# Patient Record
Sex: MALE
[De-identification: names, ages, dates, MRNs, and addresses within clinical notes are randomized per-mention and may not be internally consistent; named-entity substitution may affect disease eponyms.]

---

## 2018-08-15 ENCOUNTER — HOSPITAL ENCOUNTER (EMERGENCY)
Dept: HOSPITAL 14 - H.ER | Age: 43
Discharge: HOME | End: 2018-08-15
Payer: MEDICAID

## 2018-08-15 VITALS
SYSTOLIC BLOOD PRESSURE: 168 MMHG | TEMPERATURE: 97.9 F | RESPIRATION RATE: 20 BRPM | DIASTOLIC BLOOD PRESSURE: 103 MMHG | OXYGEN SATURATION: 98 % | HEART RATE: 62 BPM

## 2018-08-15 DIAGNOSIS — R42: Primary | ICD-10-CM

## 2018-08-15 DIAGNOSIS — E11.9: ICD-10-CM

## 2018-08-15 DIAGNOSIS — I10: ICD-10-CM

## 2018-08-15 DIAGNOSIS — R20.2: ICD-10-CM

## 2018-08-15 DIAGNOSIS — F41.9: ICD-10-CM

## 2018-08-15 LAB
ALBUMIN SERPL-MCNC: 4.1 G/DL (ref 3.5–5)
ALBUMIN/GLOB SERPL: 1 {RATIO} (ref 1–2.1)
ALT SERPL-CCNC: 28 U/L (ref 21–72)
AST SERPL-CCNC: 37 U/L (ref 17–59)
BASOPHILS # BLD AUTO: 0.1 K/UL (ref 0–0.2)
BASOPHILS NFR BLD: 1.4 % (ref 0–2)
BUN SERPL-MCNC: 11 MG/DL (ref 9–20)
CALCIUM SERPL-MCNC: 8.6 MG/DL (ref 8.4–10.2)
EOSINOPHIL # BLD AUTO: 0 K/UL (ref 0–0.7)
EOSINOPHIL NFR BLD: 0.5 % (ref 0–4)
ERYTHROCYTE [DISTWIDTH] IN BLOOD BY AUTOMATED COUNT: 14.6 % (ref 11.5–14.5)
GFR NON-AFRICAN AMERICAN: > 60
HGB BLD-MCNC: 13.9 G/DL (ref 12–18)
LYMPHOCYTES # BLD AUTO: 2.5 K/UL (ref 1–4.3)
LYMPHOCYTES NFR BLD AUTO: 24.7 % (ref 20–40)
MCH RBC QN AUTO: 29.2 PG (ref 27–31)
MCHC RBC AUTO-ENTMCNC: 33.9 G/DL (ref 33–37)
MCV RBC AUTO: 86.1 FL (ref 80–94)
MONOCYTES # BLD: 0.6 K/UL (ref 0–0.8)
MONOCYTES NFR BLD: 6.1 % (ref 0–10)
NEUTROPHILS # BLD: 6.9 K/UL (ref 1.8–7)
NEUTROPHILS NFR BLD AUTO: 67.3 % (ref 50–75)
NRBC BLD AUTO-RTO: 0.1 % (ref 0–0)
PLATELET # BLD: 238 K/UL (ref 130–400)
PMV BLD AUTO: 10.3 FL (ref 7.2–11.7)
RBC # BLD AUTO: 4.77 MIL/UL (ref 4.4–5.9)
WBC # BLD AUTO: 10.3 K/UL (ref 4.8–10.8)

## 2018-08-15 NOTE — ED PDOC
HPI: General Adult


Time Seen by Provider: 08/15/18 12:26


Chief Complaint (Nursing): Dizziness/Lightheaded


History Per: Patient


History/Exam Limitations: no limitations


Onset/Duration Of Symptoms: Days


Current Symptoms Are (Timing): Better


Additional Complaint(s): 


43 year old male with a past medical history of high blood pressure and 

diabetes presents to the ED for an evaluation of dizziness and weakness. 

Patient states the dizziness started yesterday around 3-4pm with associated 

symptoms of double vision and numbness in his arms so he went home. At home, he 

took a shower and went to sleep at 9:30pm. He woke up and went out to eat tacos 

and then went to bed at 12am. When patient woke up at 10:30am today, he started 

to hear voices that were bothering him. Also reports of light headedness and 

states he has no strength in his arms and legs. Patient states he feels much 

better today. Denies abdominal pain, chest pain, fever or cough. 


PMD: Jamal Cortes








Past Medical History


Reviewed: Historical Data, Nursing Documentation, Vital Signs


Vital Signs: 


 Last Vital Signs











Temp  97.9 F   08/15/18 12:18


 


Pulse  62   08/15/18 12:18


 


Resp  20   08/15/18 12:18


 


BP  168/103 H  08/15/18 12:18


 


Pulse Ox  98   08/15/18 13:27














- Medical History


PMH: Anxiety, Diabetes, HTN





- Surgical History


Surgical History: No Surg Hx





- Family History


Family History: States: Unknown Family Hx





- Social History


Current smoker - smoking cessation education provided: No


Alcohol: None


Drugs: Denies





- Allergies


Allergies/Adverse Reactions: 


 Allergies











Allergy/AdvReac Type Severity Reaction Status Date / Time


 


No Known Allergies Allergy   Verified 08/15/18 12:19














Review of Systems


ROS Statement: Except As Marked, All Systems Reviewed And Found Negative


Constitutional: Positive for: Weakness.  Negative for: Fever


Eyes: Positive for: Vision Change


Cardiovascular: Negative for: Chest Pain


Respiratory: Negative for: Cough


Gastrointestinal: Negative for: Abdominal Pain


Neurological: Positive for: Dizziness


Psych: Negative for: Suicidal ideation (homicidal ideation)





Physical Exam





- Reviewed


Nursing Documentation Reviewed: Yes


Vital Signs Reviewed: Yes





- Physical Exam


Appears: Positive for: Well, Non-toxic, No Acute Distress


Head Exam: Positive for: ATRAUMATIC, NORMAL INSPECTION, NORMOCEPHALIC


Skin: Positive for: Normal Color, Warm, Dry


Eye Exam: Positive for: EOMI, Normal appearance, PERRL


ENT: Positive for: Normal ENT Inspection


Neck: Positive for: Normal, Painless ROM, Supple.  Negative for: Decreased ROM


Cardiovascular/Chest: Positive for: Regular Rate, Rhythm.  Negative for: Murmur


Respiratory: Positive for: Normal Breath Sounds.  Negative for: Decreased 

Breath Sounds, Wheezing, Respiratory Distress


Gastrointestinal/Abdominal: Positive for: Normal Exam, Bowel Sounds, Soft.  

Negative for: Tenderness, Guarding, Rebound


Back: Positive for: Normal Inspection


Extremity: Positive for: Normal ROM.  Negative for: Tenderness, Pedal Edema, 

Deformity


Neurologic/Psych: Positive for: Alert, Oriented (x3).  Negative for: Motor/

Sensory Deficits





- Laboratory Results


Result Diagrams: 


 08/15/18 13:24





 08/15/18 13:24





- ECG


O2 Sat by Pulse Oximetry: 98 (RA)


Pulse Ox Interpretation: Normal





- Progress


Re-evaluation Time: 14:00


Condition: Improved





Medical Decision Making


Medical Decision Making: 


Time: 1301


Initial Plan:


--EKG


--CMP


--ED Urine Dipstick 


--CBC w/ Differential 


--Heplock Insertion  


--Reevaluation 





--------------------------------------------------------------------------------

-----------------


Scribe Attestation:   


Documented by Meng Baron, acting as a scribe for Grace Amin MD





Provider Scribe Attestation:


All medical record entries made by the Scribe were at my direction and 

personally dictated by me. I have reviewed the chart and agree that the record 

accurately reflects my personal performance of the history, physical exam, 

medical decision making, and the department course for this patient. I have 

also personally directed, reviewed, and agree with the discharge instructions 

and disposition.





 








Disposition





- Clinical Impression


Clinical Impression: 


 Dizziness, Hypertension








- Patient ED Disposition


Is Patient to be Admitted: No


Doctor Will See Patient In The: Office


Counseled Patient/Family Regarding: Diagnosis, Need For Followup, Rx Given





- Disposition


Referrals: 


[facility] [place] [Outside]


Sonal De La Cruz MD [Medical Doctor] - 


Disposition: Routine/Home


Disposition Time: 14:00


Condition: IMPROVED


Instructions:  Dizziness, Nonvertigo, (DC), High Blood Pressure in Adults


Forms:  [facility] (English)


Print Language: Lithuanian





- POA


Present On Arrival: None

## 2018-08-16 NOTE — CARD
--------------- APPROVED REPORT --------------





Date of service: 08/15/2018



<Conclusion>

Sinus bradycardia with sinus arrhythmia with occasional premature 

ventricular complexes

Left axis deviation

Left ventricular hypertrophy with QRS widening and repolarization 

abnormality vs lateral wall ischemia

Abnormal ECG